# Patient Record
Sex: MALE | Race: BLACK OR AFRICAN AMERICAN
[De-identification: names, ages, dates, MRNs, and addresses within clinical notes are randomized per-mention and may not be internally consistent; named-entity substitution may affect disease eponyms.]

---

## 2020-07-13 ENCOUNTER — HOSPITAL ENCOUNTER (EMERGENCY)
Dept: HOSPITAL 72 - EMR | Age: 20
Discharge: HOME | End: 2020-07-13
Payer: SELF-PAY

## 2020-07-13 VITALS — DIASTOLIC BLOOD PRESSURE: 72 MMHG | SYSTOLIC BLOOD PRESSURE: 117 MMHG

## 2020-07-13 VITALS — WEIGHT: 210 LBS | BODY MASS INDEX: 27.83 KG/M2 | HEIGHT: 73 IN

## 2020-07-13 DIAGNOSIS — S50.01XA: ICD-10-CM

## 2020-07-13 DIAGNOSIS — S53.401A: Primary | ICD-10-CM
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
## 2020-07-13 PROCEDURE — 99283 EMERGENCY DEPT VISIT LOW MDM: CPT

## 2020-07-13 PROCEDURE — 29125 APPL SHORT ARM SPLINT STATIC: CPT
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
## 2020-07-13 NOTE — DIAGNOSTIC IMAGING REPORT
Indications:Pain, trauma

 

Technique: Three or 4 views  of the right elbow

 

Comparison: None

 

Findings: No acute fractures. No dislocations. No definite effusion. Joint spaces are

preserved

 

Impression: Negative